# Patient Record
Sex: FEMALE | Race: WHITE | NOT HISPANIC OR LATINO | Employment: UNEMPLOYED | URBAN - METROPOLITAN AREA
[De-identification: names, ages, dates, MRNs, and addresses within clinical notes are randomized per-mention and may not be internally consistent; named-entity substitution may affect disease eponyms.]

---

## 2024-01-09 ENCOUNTER — APPOINTMENT (OUTPATIENT)
Dept: RHEUMATOLOGY | Facility: CLINIC | Age: 44
End: 2024-01-09
Payer: MEDICARE

## 2024-02-06 ENCOUNTER — APPOINTMENT (OUTPATIENT)
Dept: RHEUMATOLOGY | Facility: CLINIC | Age: 44
End: 2024-02-06
Payer: MEDICARE

## 2024-02-11 NOTE — PROGRESS NOTES
Rheumatology Office Visit      Subjective     Patient ID: [id number]     Leyla Sher is a 43 y.o. female who presents for No chief complaint on file..    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 She previously followed with Dr. Sam Gil at [facility]. Prior treatment includes high dose steroids, HCQ, methotrexate, IVIg. Symptoms started as a diffuse, itchy rash which dermatology diagnosed as dermatomyositis 04/2021 and she was treated with topical tacrolimus and hydrocortisone. Subsequently developed muscle stiffness, shoulder, and thigh pain. Doesn't appear EMG or muscle biopsy were done. No documented elevated CK. DOC panel WNL.     Review of Systems  As per HPI     Objective  {OB Vitals:67464}    Physical Exam  General: Cooperative, in NAD   Eyes: Conjunctiva clear, sclera white, anicteric   Nose: No external deformity, no mucosal crusting or signs of bleeding   Throat/Mouth: Moist mucous membranes, normal dentition, no ulcers or lesions   Lungs: No respiratory distress; lungs CTAB; no wheezes, rales, rhonchi, or stridor   Heart: Normal S1 and S2; regular rate and rhythm; no murmurs, rubs, or gallops  Skin: No rashes, ulcers, tophi, or nodules noted  MSK:   Spine: Normal posture, no point tenderness to palpation, normal ROM  Upper Extremities:   Hands: No erythema, warmth, synovitis, or pain of the DIPs, PIPs, or MCPs; normal ROM  Wrists: No erythema, warmth, synovitis, or pain of the wrists; normal ROM  Elbows: No erythema, warmth, synovitis, or pain; normal ROM   Shoulders: No erythema, warmth, appreciable swelling, or pain; normal ROM   Lower Extremities:   Hips: No gross deformity, erythema, warmth, or pain; normal ROM   Knees: No erythema, warmth, swelling, or pain; normal ROM   Ankles: No erythema, warmth, synovitis, or pain; normal ROM  Feet: No gross deformity, erythema, or swelling; MTP  "squeeze negative bilaterally    Last BMP: No results found for: \"NA\", \"K\", \"CL\", \"CO2\", \"BUN\", \"GLU\", \"CREATININE\", \"CALCIUM\"  Last CBC: No components found for: \"CBC\"  Last CRP: No results found for: \"CRP\"  Last ESR: No results found for: \"SEDRATE\"  Last Hepatic Function Results: No results found for: \"BILITOT\", \"ALT\", \"AST\", \"ALKPHOS\", \"ALBUMIN\", \"PROT\"    Assessment/Plan   There are no diagnoses linked to this encounter.    Follow-up ***    Patient seen and discussed with attending  ***    Reva Mcgowan MD  PGY-V, Rheumatology    "

## 2024-02-12 ENCOUNTER — APPOINTMENT (OUTPATIENT)
Dept: RHEUMATOLOGY | Facility: CLINIC | Age: 44
End: 2024-02-12
Payer: MEDICARE

## 2024-03-11 ENCOUNTER — APPOINTMENT (OUTPATIENT)
Dept: RHEUMATOLOGY | Facility: CLINIC | Age: 44
End: 2024-03-11
Payer: MEDICARE

## 2024-04-15 ENCOUNTER — APPOINTMENT (OUTPATIENT)
Dept: RHEUMATOLOGY | Facility: CLINIC | Age: 44
End: 2024-04-15
Payer: MEDICARE

## 2025-03-07 ENCOUNTER — HOSPITAL ENCOUNTER (OUTPATIENT)
Dept: RADIOLOGY | Facility: CLINIC | Age: 45
End: 2025-03-07
Payer: MEDICARE

## 2025-03-07 DIAGNOSIS — Z12.31 SCREENING MAMMOGRAM FOR BREAST CANCER: ICD-10-CM

## 2025-03-28 ENCOUNTER — APPOINTMENT (OUTPATIENT)
Dept: RADIOLOGY | Facility: CLINIC | Age: 45
End: 2025-03-28
Payer: MEDICARE

## 2025-03-28 DIAGNOSIS — Z12.31 SCREENING MAMMOGRAM FOR BREAST CANCER: ICD-10-CM

## 2025-04-01 ENCOUNTER — APPOINTMENT (OUTPATIENT)
Dept: PRIMARY CARE | Facility: CLINIC | Age: 45
End: 2025-04-01
Payer: MEDICARE

## 2025-04-01 ENCOUNTER — APPOINTMENT (OUTPATIENT)
Dept: RADIOLOGY | Facility: CLINIC | Age: 45
End: 2025-04-01
Payer: MEDICARE

## 2025-04-01 DIAGNOSIS — Z12.31 SCREENING MAMMOGRAM FOR BREAST CANCER: ICD-10-CM

## 2025-06-17 ASSESSMENT — RHEUMATOLOGY NEW PATIENT QUESTIONNAIRE
PAIN OR BURNING ON URINATION: N
EASILY LOSING TEMPER: Y
EXCESSIVE HAIR LOSS (MORE THAN YOUR NORM): Y
FEVER: N
SUN SENSITIVE (SUN ALLERGY): Y
HOARSE VOICE: N
DRYNESS OF MOUTH: Y
NUMBNESS OR TINGLING IN HANDS OR FEET: Y
ANEMIA: N
MORNING STIFFNESS IN LOWER BACK: Y
HOW WOULD YOU DESCRIBE YOUR STIFFNESS ON AVERAGE: MODERATE
DIFFICULTY STAYING ASLEEP: Y
PERSISTENT DIARRHEA: N
EYE DRYNESS: Y
UNUSUAL FATIGUE: Y
UNEXPLAINED WEIGHT CHANGE: Y
EYE PAIN: N
EASY BRUISING: Y
EYE REDNESS: N
ANXIETY: Y
NODULES/BUMPS: Y
DOUBLE OR BLURRED VISION: N
JOINT PAIN: Y
CHEST PAIN: N
BLACK STOOLS: N
VOMITING OF BLOOD OR COFFEE GROUND CONSISTENCY MATERIAL: N
SKIN TIGHTNESS: N
UNUSUALLY RAPID OR SLOWED HEART RATE: Y
SWOLLEN LEGS OR FEET: N
AGITATION: Y
MUSCLE WEAKNESS: Y
SORES IN MOUTH OR NOSE: N
DEPRESSION: Y
DIFFICULTY BREATHING LYING DOWN: N
BEHAVIORAL CHANGES: Y
NAUSEA: Y
UNEXPLAINED HEARING LOSS: N
LOSS OF VISION: Y
UNUSUAL BLEEDING: N
BLOOD IN STOOLS: N
COUGH: N
SKIN REDNESS: Y
NIGHT SWEATS: N
RASH: Y
HEADACHES: Y
SWOLLEN OR TENDER GLANDS: N
JOINT SWELLING: Y
LOSS OF CONSCIOUSNESS: N
STOMACH PAIN: Y
RASH OR ULCERS: N
MEMORY LOSS: N
INCREASED SUSCEPTIBILITY TO INFECTION: Y
JAUNDICE: N
COLOR CHANGES OF HANDS OR FEET IN THE COLD: Y
ABNORMAL URINE: N
FAINTING: N
HEARTBURN OR REFLUX: Y
SEIZURES: N
SHORTNESS OF BREATH: Y
DIFFICULTY FALLING ASLEEP: Y
MORNING STIFFNESS: Y
DIFFICULTY SWALLOWING: Y
VAGINAL DRYNESS: N

## 2025-06-18 ENCOUNTER — OFFICE VISIT (OUTPATIENT)
Dept: RHEUMATOLOGY | Facility: CLINIC | Age: 45
End: 2025-06-18
Payer: COMMERCIAL

## 2025-06-18 VITALS
SYSTOLIC BLOOD PRESSURE: 125 MMHG | DIASTOLIC BLOOD PRESSURE: 84 MMHG | HEART RATE: 87 BPM | OXYGEN SATURATION: 96 % | HEIGHT: 64 IN | TEMPERATURE: 97.4 F

## 2025-06-18 DIAGNOSIS — M79.7 FIBROMYALGIA: ICD-10-CM

## 2025-06-18 DIAGNOSIS — M33.13 DERMATOMYOSITIS (MULTI): Primary | ICD-10-CM

## 2025-06-18 PROCEDURE — 99205 OFFICE O/P NEW HI 60 MIN: CPT | Performed by: STUDENT IN AN ORGANIZED HEALTH CARE EDUCATION/TRAINING PROGRAM

## 2025-06-18 RX ORDER — DEXTROAMPHETAMINE SACCHARATE, AMPHETAMINE ASPARTATE, DEXTROAMPHETAMINE SULFATE AND AMPHETAMINE SULFATE 3.75; 3.75; 3.75; 3.75 MG/1; MG/1; MG/1; MG/1
15 TABLET ORAL DAILY
COMMUNITY
Start: 2025-06-17

## 2025-06-18 RX ORDER — LORAZEPAM 2 MG/1
1 TABLET ORAL EVERY 6 HOURS PRN
COMMUNITY
Start: 2025-06-11

## 2025-06-18 RX ORDER — OMEPRAZOLE/SODIUM BICARBONATE 20MG-1.1G
1 CAPSULE ORAL
COMMUNITY
Start: 2024-11-01

## 2025-06-18 RX ORDER — LITHIUM CARBONATE 150 MG/1
150 CAPSULE ORAL NIGHTLY
COMMUNITY
Start: 2024-11-01

## 2025-06-18 RX ORDER — PROMETHAZINE HYDROCHLORIDE 25 MG/1
25 TABLET ORAL EVERY 6 HOURS PRN
COMMUNITY
Start: 2024-12-26

## 2025-06-18 RX ORDER — IMMUNE GLOBULIN INFUSION (HUMAN) 100 MG/ML
INJECTION, SOLUTION INTRAVENOUS; SUBCUTANEOUS
COMMUNITY
Start: 2024-12-26

## 2025-06-18 RX ORDER — AMITRIPTYLINE HYDROCHLORIDE 25 MG/1
25 TABLET, FILM COATED ORAL NIGHTLY
COMMUNITY
Start: 2025-06-12

## 2025-06-18 RX ORDER — BREXPIPRAZOLE 2 MG/1
1 TABLET ORAL
COMMUNITY
Start: 2025-06-11

## 2025-06-18 NOTE — PROGRESS NOTES
Rheumatology New Patient Note      Subjective    Subjective   History of Presenting Illness  Ms. Leyla Sher is a 44 y.o. female with a PMHx of dermatomyositis and fibromyalgia, presenting to establish with new Rheumatologist.     Rheum Hx (from patient and extensive chart review):     Rash (onset 3/2021):  Symptom:   development of a pruritic rash on her forehead that later spread to her face, arms, and back. Rheum note documents class heliotrope rash, shawl sign, and holster sign.     Workup:   +MERCEDES (1:640), Mi-2? antibody positive, TIF-1? borderline positive. Skin biopsy demonstrated interface dermatitis and dermal mucin deposition.     Treatment:  -Initially responsive to prednisone (10-20 mg), but flared with tapering. Later trialed on Hydroxychloroquine and Methotrexate (up to 22.5 mg) which were ineffective   -Started on IVIG in 2022, with documented improvement in her rash   -prior Rheum retired, continued on monthly IVIG at home under guidance of Dermatology   -saw Rheum at [facility] 12/2024 who recommended continued IVIG, though not documented follow up and she reports being off IVIG for at least 6 months now     MSK:    Symptom:  -shortly after onset of rash, reported fatigue and proximal muscle discomfort and functional limitations c/w proximal muscle involvement (difficulty raising her arms and rising from chairs)    Workup:  -diffuse muscle tenderness though 5/5 strength consistently documented on physical exam   -repeat CK and Aldolase have been persistently normal since initial presentation   - U/S (12/2024): right quad with mild hypoechoic stranding; other muscle groups normal    Treatment:  -no response to steroids, HCQ, MTX, or IVIG   -has not done PT     TODAY:   She describes diffuse myofascial pain and stiffness, worse with activity, fatigue and non-restorative sleep, episodic joint pain with redness and warmth that self-resolves within hours, facial rash (present on exam) and nail changes. She  denies aracelis joint swelling, Raynaud's, fevers, weight loss, or oral ulcers. Denies current SOB or cough, though had this in the past (no ILD evaluation complete). Notes these symptoms are unchanged when on or off IVIG.     Notably, she shares that she has longstanding anxiety and depression, for which she is followed by a therapist in weekly sessions and is currently taking Elavil. She has difficulty leaving her home due to the severity of her anxiety and rarely goes out. 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 She understands that Fibromyalgia may be contributing significantly to her symptom burden.       Medical History[1]     RX Allergies[2]     Objective   Objective     PHYSICAL EXAM:  General - tearful but NAD, answers questions appropriately   Pulmonary: breathing comfortably on room air, no cough, no wheezing   Skin: faint facial rash; periungual erythema  MSK: No synovitis or joint effusion. Full strength proximally and distally but diffusely tender to palpation over muscle groups.   Neurologic: Normal bulk and tone, symmetric reflexes     LABS:  Results from last 7 days   Lab Units 06/18/25  1052   [facility] WBC AUTO Thousand/uL 7.3   [facility] RBC AUTO Million/uL 4.78   [facility] HEMOGLOBIN g/dL 13.9   [facility] MCV fL 92.5     Results from last 7 days   Lab Units 06/18/25  1052   [facility] SODIUM mmol/L 139   [facility] POTASSIUM mmol/L 4.3   [facility] CHLORIDE mmol/L 105   [facility] CO2 mmol/L 23   [facility] BUN mg/dL 10   [facility] CREATININE mg/dL 1.15*   [facility] EGFR mL/min/1.73m2 60   [facility] CALCIUM mg/dL 10.3*   [facility] ALBUMIN g/dL 4.6   [facility] BILIRUBIN TOTAL mg/dL 0.3   [facility] ALT U/L 20   [facility] AST U/L 18   [facility] ALK PHOS U/L 66   [facility] PROTEIN TOTAL g/dL 7.3     Lab Results   Component Value Date    CRP <3.0 06/18/2025     Lab Results   Component Value Date    SEDRATE 6 06/18/2025     Previous imaging reviewed:   CXR  (12/2024): No acute cardiopulmonary disease  X-ray hands & SI joints: No signs of inflammatory arthritis or sacroiliitis  Muscle US: Right quad with mild hypoechoic stranding; other muscle groups normal       Assessment    Assessment & Plan   Ms. Leyla Sher is a 44 y.o.  F with PMHx of Dermatomyositis (Mi-2? positive, biopsy-confirmed) and Fibromyalgia p/w rash, diffuse muscle pain, and fatigue.  Currently, there are no clear objective findings to suggest active inflammatory myopathy, though further evaluation is warranted to assess for subtle or subclinical disease activity.     Her symptoms may be multifactorial, with fibromyalgia and chronic pain likely contributing, and possibly amplified by anxiety and functional limitations.     She is off immunosuppression and has not noted significant change in symptoms since stopping IVIG; we will proceed with additional testing before making decisions regarding reinitiation of immunomodulatory therapy.    Plan:  -Repeat CK, aldolase, ESR, CRP  -EMG to evaluate for subclinical myopathy  -If reports recurrent SOB/cough, consider PFTs, echo, HRCT   -new PCP referral per pt request; ensure UTD w/ age-appropriate cancer screenings (given DM and borderline TIF-1? antibody positivity)  -referral to home physical therapy given patient's discomfort with leaving home  -referral to [facility] for integrative pain management options  -recommend discussing duloxetine with her mental health provider  -reviewed non-pharmacologic management including activity pacing, low-impact aerobic exercise, and sleep hygiene      Case seen and discussed with Dr. Leyva  Signature: Stephanie Damon,   Date: June 20, 2025        [1] No past medical history on file.  [2]   Allergies  Allergen Reactions    Lisinopril Cough and Diarrhea    Chlorhexidine Rash

## 2025-06-19 LAB
ALBUMIN SERPL-MCNC: 4.6 G/DL (ref 3.6–5.1)
ALDOLASE SERPL-CCNC: 4.3 U/L
ALP SERPL-CCNC: 66 U/L (ref 31–125)
ALT SERPL-CCNC: 20 U/L (ref 6–29)
ANION GAP SERPL CALCULATED.4IONS-SCNC: 11 MMOL/L (CALC) (ref 7–17)
AST SERPL-CCNC: 18 U/L (ref 10–35)
BASOPHILS # BLD AUTO: 44 CELLS/UL (ref 0–200)
BASOPHILS NFR BLD AUTO: 0.6 %
BILIRUB SERPL-MCNC: 0.3 MG/DL (ref 0.2–1.2)
BUN SERPL-MCNC: 10 MG/DL (ref 7–25)
CALCIUM SERPL-MCNC: 10.3 MG/DL (ref 8.6–10.2)
CHLORIDE SERPL-SCNC: 105 MMOL/L (ref 98–110)
CK SERPL-CCNC: 33 U/L (ref 20–239)
CO2 SERPL-SCNC: 23 MMOL/L (ref 20–32)
CREAT SERPL-MCNC: 1.15 MG/DL (ref 0.5–0.99)
CRP SERPL-MCNC: <3 MG/L
EGFRCR SERPLBLD CKD-EPI 2021: 60 ML/MIN/1.73M2
EOSINOPHIL # BLD AUTO: 58 CELLS/UL (ref 15–500)
EOSINOPHIL NFR BLD AUTO: 0.8 %
ERYTHROCYTE [DISTWIDTH] IN BLOOD BY AUTOMATED COUNT: 17.9 % (ref 11–15)
ERYTHROCYTE [SEDIMENTATION RATE] IN BLOOD BY WESTERGREN METHOD: 6 MM/H
GLUCOSE SERPL-MCNC: 66 MG/DL (ref 65–99)
HCT VFR BLD AUTO: 44.2 % (ref 35–45)
HGB BLD-MCNC: 13.9 G/DL (ref 11.7–15.5)
LYMPHOCYTES # BLD AUTO: 1168 CELLS/UL (ref 850–3900)
LYMPHOCYTES NFR BLD AUTO: 16 %
MCH RBC QN AUTO: 29.1 PG (ref 27–33)
MCHC RBC AUTO-ENTMCNC: 31.4 G/DL (ref 32–36)
MCV RBC AUTO: 92.5 FL (ref 80–100)
MONOCYTES # BLD AUTO: 672 CELLS/UL (ref 200–950)
MONOCYTES NFR BLD AUTO: 9.2 %
NEUTROPHILS # BLD AUTO: 5358 CELLS/UL (ref 1500–7800)
NEUTROPHILS NFR BLD AUTO: 73.4 %
PLATELET # BLD AUTO: 514 THOUSAND/UL (ref 140–400)
PMV BLD REES-ECKER: 9.9 FL (ref 7.5–12.5)
POTASSIUM SERPL-SCNC: 4.3 MMOL/L (ref 3.5–5.3)
PROT SERPL-MCNC: 7.3 G/DL (ref 6.1–8.1)
RBC # BLD AUTO: 4.78 MILLION/UL (ref 3.8–5.1)
SODIUM SERPL-SCNC: 139 MMOL/L (ref 135–146)
WBC # BLD AUTO: 7.3 THOUSAND/UL (ref 3.8–10.8)

## 2025-06-21 NOTE — PROGRESS NOTES
I saw and evaluated the patient. I personally obtained the key and critical portions of the history and physical exam or was physically present for key and critical portions performed by the trainee. I reviewed the trainee's documentation and discussed the patient with the trainee. I agree with the trainee's medical decision making, as documented on the trainee's note.     Russell Leyva MD

## 2025-06-26 ENCOUNTER — APPOINTMENT (OUTPATIENT)
Dept: PRIMARY CARE | Facility: CLINIC | Age: 45
End: 2025-06-26
Payer: COMMERCIAL

## 2025-07-01 ENCOUNTER — APPOINTMENT (OUTPATIENT)
Dept: PRIMARY CARE | Facility: CLINIC | Age: 45
End: 2025-07-01
Payer: MEDICARE